# Patient Record
Sex: MALE | Race: OTHER | HISPANIC OR LATINO
[De-identification: names, ages, dates, MRNs, and addresses within clinical notes are randomized per-mention and may not be internally consistent; named-entity substitution may affect disease eponyms.]

---

## 2020-12-07 DIAGNOSIS — Z20.828 CONTACT WITH AND (SUSPECTED) EXPOSURE TO OTHER VIRAL COMMUNICABLE DISEASES: ICD-10-CM

## 2020-12-07 PROBLEM — Z00.00 ENCOUNTER FOR PREVENTIVE HEALTH EXAMINATION: Status: ACTIVE | Noted: 2020-12-07

## 2021-01-18 DIAGNOSIS — Z20.822 ENCOUNTER FOR PREPROCEDURAL LABORATORY EXAMINATION: ICD-10-CM

## 2021-01-18 DIAGNOSIS — Z01.812 ENCOUNTER FOR PREPROCEDURAL LABORATORY EXAMINATION: ICD-10-CM

## 2021-01-23 ENCOUNTER — APPOINTMENT (OUTPATIENT)
Dept: DISASTER EMERGENCY | Facility: CLINIC | Age: 56
End: 2021-01-23

## 2021-01-23 DIAGNOSIS — Z01.818 ENCOUNTER FOR OTHER PREPROCEDURAL EXAMINATION: ICD-10-CM

## 2021-01-26 ENCOUNTER — APPOINTMENT (OUTPATIENT)
Dept: INTERNAL MEDICINE | Facility: CLINIC | Age: 56
End: 2021-01-26
Payer: MEDICAID

## 2021-01-26 ENCOUNTER — NON-APPOINTMENT (OUTPATIENT)
Age: 56
End: 2021-01-26

## 2021-01-26 VITALS
DIASTOLIC BLOOD PRESSURE: 70 MMHG | HEART RATE: 74 BPM | BODY MASS INDEX: 23.24 KG/M2 | TEMPERATURE: 99.2 F | OXYGEN SATURATION: 97 % | WEIGHT: 166 LBS | SYSTOLIC BLOOD PRESSURE: 110 MMHG | HEIGHT: 71 IN | RESPIRATION RATE: 16 BRPM

## 2021-01-26 DIAGNOSIS — G12.20 MOTOR NEURON DISEASE, UNSPECIFIED: ICD-10-CM

## 2021-01-26 DIAGNOSIS — R13.10 DYSPHAGIA, UNSPECIFIED: ICD-10-CM

## 2021-01-26 LAB — SARS-COV-2 N GENE NPH QL NAA+PROBE: NOT DETECTED

## 2021-01-26 PROCEDURE — 99072 ADDL SUPL MATRL&STAF TM PHE: CPT

## 2021-01-26 PROCEDURE — 94727 GAS DIL/WSHOT DETER LNG VOL: CPT

## 2021-01-26 PROCEDURE — 99204 OFFICE O/P NEW MOD 45 MIN: CPT | Mod: 25

## 2021-01-26 PROCEDURE — 94729 DIFFUSING CAPACITY: CPT

## 2021-01-26 PROCEDURE — 94010 BREATHING CAPACITY TEST: CPT

## 2021-01-26 PROCEDURE — XXXXX: CPT

## 2021-01-26 NOTE — PHYSICAL EXAM
[No Acute Distress] : no acute distress [Normal Oropharynx] : normal oropharynx [Normal Appearance] : normal appearance [No Neck Mass] : no neck mass [Normal Rate/Rhythm] : normal rate/rhythm [Normal S1, S2] : normal s1, s2 [No Murmurs] : no murmurs [No Resp Distress] : no resp distress [Clear to Auscultation Bilaterally] : clear to auscultation bilaterally [No Abnormalities] : no abnormalities [Benign] : benign [Normal Gait] : normal gait [No Clubbing] : no clubbing [No Cyanosis] : no cyanosis [No Edema] : no edema [Normal Color/ Pigmentation] : normal color/ pigmentation [No Focal Deficits] : no focal deficits [Oriented x3] : oriented x3 [Normal Affect] : normal affect [TextBox_68] : few audible rhonchi bilat.

## 2021-01-26 NOTE — PROCEDURE
[FreeTextEntry1] : Full pulmonary function testing today is within normal limits.  Diffusing capacity is normal.  Oxygen saturation is 97% on room air.

## 2021-01-26 NOTE — ASSESSMENT
[FreeTextEntry1] : #1  Patient being followed by neurology, Dr. 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  He is also to see gastroenterology soon.  Instructions were for important reports to be forwarded here for our records.  He denies having symptoms suggestive of aspiration and is on a regular diet and liquids.  He has no previous history of pneumonia.  He will continue to follow with his neurologist as well as gastroenterology evaluation.  Instructed him to return in 4 months for a following pulmonary appointment.  Call or return at any time if there should be any symptoms or clinical change.\par \par Reportedly had a normal chest x-ray in July 2020.  For PA and lateral chest x-ray.

## 2021-01-26 NOTE — HISTORY OF PRESENT ILLNESS
[TextBox_4] : Patient is accompanied by his stepdaughter Prieto, who translates Sinhala/English.  \par \par This is the first pulmonary visit here for this 55-year-old male who was noted some difficulty or swallowing symptoms for about 1 year.  Currently being followed by Dr. James Contreras, neurologist, at 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  She tells me that the neurologist has to follow him further to see if this diagnosis pans out.  Currently he is on a regular diet and liquids and denies having any aspiration symptoms.  No history of pneumonia.  Not having coughing, wheezing, sputum production, hemoptysis, or dyspnea on exertion.  He is not having fever or chills.  He is a non-smoker.  He has an occasional alcohol drink.\par \par He has no history of heart disease.  He is not having recent chest pain, palpitations, or syncope.\par \par Denies having weakness.\par \par The patient [place] up in [place] moved to the  1 year ago.

## 2021-01-26 NOTE — REVIEW OF SYSTEMS
[Negative] : Endocrine [Fever] : no fever [Chills] : no chills [Cough] : no cough [Sputum] : no sputum [Dyspnea] : no dyspnea [TextBox_30] : see above [TextBox_69] : see above [TextBox_119] : see above

## 2021-05-11 ENCOUNTER — APPOINTMENT (OUTPATIENT)
Dept: INTERNAL MEDICINE | Facility: CLINIC | Age: 56
End: 2021-05-11

## 2021-07-06 ENCOUNTER — EMERGENCY (EMERGENCY)
Facility: HOSPITAL | Age: 56
LOS: 1 days | Discharge: HOME | End: 2021-07-06
Attending: EMERGENCY MEDICINE | Admitting: EMERGENCY MEDICINE
Payer: MEDICAID

## 2021-07-06 VITALS
RESPIRATION RATE: 18 BRPM | HEART RATE: 80 BPM | OXYGEN SATURATION: 99 % | DIASTOLIC BLOOD PRESSURE: 77 MMHG | TEMPERATURE: 98 F | SYSTOLIC BLOOD PRESSURE: 112 MMHG

## 2021-07-06 VITALS
OXYGEN SATURATION: 100 % | SYSTOLIC BLOOD PRESSURE: 120 MMHG | DIASTOLIC BLOOD PRESSURE: 68 MMHG | RESPIRATION RATE: 16 BRPM | TEMPERATURE: 97 F | HEIGHT: 71 IN | HEART RATE: 91 BPM | WEIGHT: 158.07 LBS

## 2021-07-06 LAB
HCOV PNL SPEC NAA+PROBE: DETECTED
RAPID RVP RESULT: DETECTED
S PYO DNA THROAT QL NAA+PROBE: SIGNIFICANT CHANGE UP
SARS-COV-2 RNA SPEC QL NAA+PROBE: SIGNIFICANT CHANGE UP

## 2021-07-06 PROCEDURE — 71046 X-RAY EXAM CHEST 2 VIEWS: CPT

## 2021-07-06 PROCEDURE — 87651 STREP A DNA AMP PROBE: CPT

## 2021-07-06 PROCEDURE — 71046 X-RAY EXAM CHEST 2 VIEWS: CPT | Mod: 26

## 2021-07-06 PROCEDURE — 0225U NFCT DS DNA&RNA 21 SARSCOV2: CPT

## 2021-07-06 PROCEDURE — 99283 EMERGENCY DEPT VISIT LOW MDM: CPT | Mod: 25

## 2021-07-06 PROCEDURE — 87798 DETECT AGENT NOS DNA AMP: CPT

## 2021-07-06 PROCEDURE — 99284 EMERGENCY DEPT VISIT MOD MDM: CPT

## 2021-07-06 NOTE — ED PROVIDER NOTE - CLINICAL SUMMARY MEDICAL DECISION MAKING FREE TEXT BOX
56 yo male with h/o motor neuron disease, cerebral infarct, GERD, myasthenia gravis, chronic dysphagia presents to the ED c/o SOB and difficulty clearing phlegm from his throat (which makes it hard to breath). + multiple sick contacts at home (viral symptoms). Patient received covid vaccine. Denies fever, chills, chest pain, cough, abd pain, N/V, UE/LE weakness or paresthesias. At baseline patient with difficulty speaking due to his autoimmune disease. Patient uses a machine at home for lung exercises. Patient trying to cough however difficulty due to his motor neuron disease. PE: as above. A/P: CXR, rvp panel, strep pcr. cxr neg for pneumonia, strep neg, rvp pending. recommend close pmd follow up.

## 2021-07-06 NOTE — ED PROVIDER NOTE - NSFOLLOWUPINSTRUCTIONS_ED_ALL_ED_FT
VIRAL SYNDROME - Discharge Care     Viral Syndrome    WHAT YOU NEED TO KNOW:    Viral syndrome is a term used for symptoms of an infection caused by a virus. Viruses are spread easily from person to person through the air and on shared items. An illness caused by a virus usually goes away in 10 to 14 days without treatment. Antibiotics are not given for a viral infection.     DISCHARGE INSTRUCTIONS:    Call 911 for the following:     You have a seizure.       You cannot be woken.       You have chest pain or trouble breathing.     Seek care immediately if:     You have a stiff neck, a bad headache, and sensitivity to light.       You feel weak, dizzy, or confused.       You stop urinating or urinate a lot less than normal.       You cough up blood or thick, yellow or green, mucus.       You have severe abdominal pain or your abdomen is larger than usual.     Contact your healthcare provider if:     Your symptoms do not get better with treatment, or get worse, after 3 days.       You have a rash or ear pain.       You have burning when you urinate.       You have questions or concerns about your condition or care.    Medicines: You may need any of the following:     Acetaminophen decreases pain and fever. It is available without a doctor's order. Ask how much medicine to take and how often to take it. Follow directions. Acetaminophen can cause liver damage if not taken correctly.       NSAIDs, such as ibuprofen, help decrease swelling, pain, and fever. NSAIDs can cause stomach bleeding or kidney problems in certain people. If you take blood thinner medicine, always ask your healthcare provider if NSAIDs are safe for you. Always read the medicine label and follow directions.      Cold medicine helps decrease swelling, control a cough, and relieve chest or nasal congestion.       Saline nasal spray helps decrease nasal congestion.       Take your medicine as directed. Contact your healthcare provider if you think your medicine is not helping or if you have side effects. Tell him of her if you are allergic to any medicine. Keep a list of the medicines, vitamins, and herbs you take. Include the amounts, and when and why you take them. Bring the list or the pill bottles to follow-up visits. Carry your medicine list with you in case of an emergency.    Manage your symptoms:     Drink liquids as directed to prevent dehydration. Ask how much liquid to drink each day and which liquids are best for you. Ask if you should drink an oral rehydration solution (ORS). An ORS has the right amounts of water, salts, and sugar you need to replace body fluids. This may help prevent dehydration caused by vomiting or diarrhea. Do not drink liquids with caffeine. Drinks with caffeine can make dehydration worse.       Get plenty of rest to help your body heal. Take naps throughout the day. Ask your healthcare provider when you can return to work and your normal activities.       Use a cool mist humidifier to help you breathe easier if you have nasal or chest congestion. Ask your healthcare provider how to use a cool mist humidifier.       Eat honey or use cough drops to help decrease throat discomfort. Ask your healthcare provider how much honey you should eat each day. Cough drops are available without a doctor's order. Follow directions for taking cough drops.       Do not smoke and stay away from others who smoke. Nicotine and other chemicals in cigarettes and cigars can cause lung damage. Smoking can also delay healing. Ask your healthcare provider for information if you currently smoke and need help to quit. E-cigarettes or smokeless tobacco still contain nicotine. Talk to your healthcare provider before you use these products.       Wash your hands frequently to prevent the spread of germs to others. Use soap and water. Use gel hand  when soap and water are not available. Wash your hands after you use the bathroom, cough, or sneeze. Wash your hands before you prepare or eat food.     Follow up with your healthcare provider as directed: Write down your questions so you remember to ask them during your visits.

## 2021-07-06 NOTE — ED PROVIDER NOTE - OBJECTIVE STATEMENT
56 yo male with h/o motor neuron disease, cerebral infarct, GERD, myasthenia gravis, chronic dysphagia presents to the ED c/o SOB and difficulty clearing phlegm from his throat (which makes it hard to breath). + multiple sick contacts at home (viral symptoms). Patient received covid vaccine. Denies fever, chills, chest pain, cough, abd pain, N/V, UE/LE weakness or paresthesias. At baseline patient with difficulty speaking due to his autoimmune disease. Patient uses a machine at home for lung exercises. Patient trying to cough however difficulty due to his motor neuron disease.

## 2021-07-06 NOTE — ED ADULT NURSE NOTE - PMH
DISPLAY PLAN FREE TEXT
Cerebral infarct    Gastroesophageal reflux disease, unspecified whether esophagitis present    Motor neuron disease    Myasthenia gravis

## 2021-07-06 NOTE — ED ADULT TRIAGE NOTE - CHIEF COMPLAINT QUOTE
shortness of breathe and feels as if mucus stuck in throat and has to keep drinking water to get down.  denies fever or chills.  h/o motor neuron disease for 1 year being followed at [place] per sister

## 2021-07-06 NOTE — ED PROVIDER NOTE - PATIENT PORTAL LINK FT
You can access the FollowMyHealth Patient Portal offered by [facility] by registering at the following website: [email or web address]. By joining [employer]’s FollowMyHealth portal, you will also be able to view your health information using other applications (apps) compatible with our system.

## 2021-07-06 NOTE — ED PROVIDER NOTE - PROGRESS NOTE DETAILS
Camilo Falk for attending Dr. Wells: 54 y/o M with a PMHx of myasthenia gravis, motor neuron disease, dysphagia, cerebral infraction, and GERD presents to the ED c/o SOB. Pt started having phlegm in his throat last night that makes it difficult to breathe. Pt denies cough, fevers. Pt endorses sick contact from a family member w/ a cold. Pt is COVID immunized. Pt has difficulty speaking at baseline 2/2 his autoimmune disease weakness. Pt uses a machine at home that helps exercise his lungs. Pt has been drinking a lot of water and eats whole foods. Pt follows w/ a neurologist. PCP: Dr. Alexis.    Physical exam: afebrile, O2 sat 100% ORA, NAD, ears/nose/oropharynx clear, no stridor, lungs CTA, heart S1S2, RRR, abd soft    MDM: SOB, r/o PNA, r/o pharyngitis. Plan CXR, strep and RVP.

## 2021-07-06 NOTE — ED ADULT NURSE NOTE - CHIEF COMPLAINT QUOTE
shortness of breathe and feels as if mucus stuck in throat and has to keep drinking water to get down.  denies fever or chills.  h/o motor neuron disease for 1 year being followed at [place] per sister

## 2021-07-06 NOTE — ED ADULT NURSE NOTE - OBJECTIVE STATEMENT
per sister in law at bedside, pt started with phlegm in his throat last night which make it hard to breath. family was sick with cold at home     Pt is COVID immunized. Pt has difficulty speaking at baseline.

## 2021-07-06 NOTE — ED PROVIDER NOTE - CARE PROVIDER_API CALL
Johnny Malik  INTERNAL MEDICINE  [address] [place] [address]  Phone: [phone]  Fax: [phone]  Follow Up Time: 1-3 Days

## 2021-07-06 NOTE — ED PROVIDER NOTE - PMH
Cerebral infarct    Gastroesophageal reflux disease, unspecified whether esophagitis present    Motor neuron disease    Myasthenia gravis

## 2021-07-07 NOTE — ED POST DISCHARGE NOTE - DETAILS
spoke to patient's family, aware of swab results, recommend close pmd follow and return to hospital if symptoms worsen.

## 2021-11-19 ENCOUNTER — EMERGENCY (EMERGENCY)
Facility: HOSPITAL | Age: 56
LOS: 1 days | Discharge: HOME | End: 2021-11-19
Attending: EMERGENCY MEDICINE | Admitting: EMERGENCY MEDICINE
Payer: MEDICAID

## 2021-11-19 VITALS
WEIGHT: 141.54 LBS | HEIGHT: 71 IN | TEMPERATURE: 99 F | OXYGEN SATURATION: 98 % | RESPIRATION RATE: 20 BRPM | HEART RATE: 118 BPM | SYSTOLIC BLOOD PRESSURE: 150 MMHG | DIASTOLIC BLOOD PRESSURE: 80 MMHG

## 2021-11-19 VITALS
HEART RATE: 99 BPM | OXYGEN SATURATION: 98 % | RESPIRATION RATE: 21 BRPM | TEMPERATURE: 98 F | DIASTOLIC BLOOD PRESSURE: 70 MMHG | SYSTOLIC BLOOD PRESSURE: 117 MMHG

## 2021-11-19 DIAGNOSIS — Z93.1 GASTROSTOMY STATUS: Chronic | ICD-10-CM

## 2021-11-19 LAB
ALBUMIN SERPL ELPH-MCNC: 3.6 G/DL — SIGNIFICANT CHANGE UP (ref 3.3–5)
ALP SERPL-CCNC: 81 U/L — SIGNIFICANT CHANGE UP (ref 30–120)
ALT FLD-CCNC: 40 U/L DA — SIGNIFICANT CHANGE UP (ref 10–60)
ANION GAP SERPL CALC-SCNC: 6 MMOL/L — SIGNIFICANT CHANGE UP (ref 5–17)
AST SERPL-CCNC: 27 U/L — SIGNIFICANT CHANGE UP (ref 10–40)
BASOPHILS # BLD AUTO: 0.01 K/UL — SIGNIFICANT CHANGE UP (ref 0–0.2)
BASOPHILS NFR BLD AUTO: 0.1 % — SIGNIFICANT CHANGE UP (ref 0–2)
BILIRUB SERPL-MCNC: 0.2 MG/DL — SIGNIFICANT CHANGE UP (ref 0.2–1.2)
BUN SERPL-MCNC: 19 MG/DL — SIGNIFICANT CHANGE UP (ref 7–23)
CALCIUM SERPL-MCNC: 9 MG/DL — SIGNIFICANT CHANGE UP (ref 8.4–10.5)
CHLORIDE SERPL-SCNC: 96 MMOL/L — SIGNIFICANT CHANGE UP (ref 96–108)
CO2 SERPL-SCNC: 33 MMOL/L — HIGH (ref 22–31)
CREAT SERPL-MCNC: 1.03 MG/DL — SIGNIFICANT CHANGE UP (ref 0.5–1.3)
D DIMER BLD IA.RAPID-MCNC: <150 NG/ML DDU — SIGNIFICANT CHANGE UP
EOSINOPHIL # BLD AUTO: 0.05 K/UL — SIGNIFICANT CHANGE UP (ref 0–0.5)
EOSINOPHIL NFR BLD AUTO: 0.7 % — SIGNIFICANT CHANGE UP (ref 0–6)
GLUCOSE SERPL-MCNC: 128 MG/DL — HIGH (ref 70–99)
HCT VFR BLD CALC: 45 % — SIGNIFICANT CHANGE UP (ref 39–50)
HGB BLD-MCNC: 15 G/DL — SIGNIFICANT CHANGE UP (ref 13–17)
IMM GRANULOCYTES NFR BLD AUTO: 0.3 % — SIGNIFICANT CHANGE UP (ref 0–1.5)
LYMPHOCYTES # BLD AUTO: 1.04 K/UL — SIGNIFICANT CHANGE UP (ref 1–3.3)
LYMPHOCYTES # BLD AUTO: 14.9 % — SIGNIFICANT CHANGE UP (ref 13–44)
MCHC RBC-ENTMCNC: 29.7 PG — SIGNIFICANT CHANGE UP (ref 27–34)
MCHC RBC-ENTMCNC: 33.3 GM/DL — SIGNIFICANT CHANGE UP (ref 32–36)
MCV RBC AUTO: 89.1 FL — SIGNIFICANT CHANGE UP (ref 80–100)
MONOCYTES # BLD AUTO: 0.39 K/UL — SIGNIFICANT CHANGE UP (ref 0–0.9)
MONOCYTES NFR BLD AUTO: 5.6 % — SIGNIFICANT CHANGE UP (ref 2–14)
NEUTROPHILS # BLD AUTO: 5.46 K/UL — SIGNIFICANT CHANGE UP (ref 1.8–7.4)
NEUTROPHILS NFR BLD AUTO: 78.4 % — HIGH (ref 43–77)
NRBC # BLD: 0 /100 WBCS — SIGNIFICANT CHANGE UP (ref 0–0)
NT-PROBNP SERPL-SCNC: 17 PG/ML — SIGNIFICANT CHANGE UP (ref 0–125)
PLATELET # BLD AUTO: 251 K/UL — SIGNIFICANT CHANGE UP (ref 150–400)
POTASSIUM SERPL-MCNC: 4.4 MMOL/L — SIGNIFICANT CHANGE UP (ref 3.5–5.3)
POTASSIUM SERPL-SCNC: 4.4 MMOL/L — SIGNIFICANT CHANGE UP (ref 3.5–5.3)
PROT SERPL-MCNC: 7.5 G/DL — SIGNIFICANT CHANGE UP (ref 6–8.3)
RAPID RVP RESULT: SIGNIFICANT CHANGE UP
RBC # BLD: 5.05 M/UL — SIGNIFICANT CHANGE UP (ref 4.2–5.8)
RBC # FLD: 12.8 % — SIGNIFICANT CHANGE UP (ref 10.3–14.5)
SARS-COV-2 RNA SPEC QL NAA+PROBE: SIGNIFICANT CHANGE UP
SODIUM SERPL-SCNC: 135 MMOL/L — SIGNIFICANT CHANGE UP (ref 135–145)
TROPONIN I, HIGH SENSITIVITY RESULT: 5.3 NG/L — SIGNIFICANT CHANGE UP
WBC # BLD: 6.97 K/UL — SIGNIFICANT CHANGE UP (ref 3.8–10.5)
WBC # FLD AUTO: 6.97 K/UL — SIGNIFICANT CHANGE UP (ref 3.8–10.5)

## 2021-11-19 PROCEDURE — 83880 ASSAY OF NATRIURETIC PEPTIDE: CPT

## 2021-11-19 PROCEDURE — 36415 COLL VENOUS BLD VENIPUNCTURE: CPT

## 2021-11-19 PROCEDURE — 71046 X-RAY EXAM CHEST 2 VIEWS: CPT

## 2021-11-19 PROCEDURE — 0225U NFCT DS DNA&RNA 21 SARSCOV2: CPT

## 2021-11-19 PROCEDURE — 85025 COMPLETE CBC W/AUTO DIFF WBC: CPT

## 2021-11-19 PROCEDURE — 93005 ELECTROCARDIOGRAM TRACING: CPT

## 2021-11-19 PROCEDURE — 84484 ASSAY OF TROPONIN QUANT: CPT

## 2021-11-19 PROCEDURE — 99285 EMERGENCY DEPT VISIT HI MDM: CPT

## 2021-11-19 PROCEDURE — 71046 X-RAY EXAM CHEST 2 VIEWS: CPT | Mod: 26

## 2021-11-19 PROCEDURE — 93010 ELECTROCARDIOGRAM REPORT: CPT

## 2021-11-19 PROCEDURE — 93970 EXTREMITY STUDY: CPT | Mod: 26

## 2021-11-19 PROCEDURE — 93970 EXTREMITY STUDY: CPT

## 2021-11-19 PROCEDURE — 85379 FIBRIN DEGRADATION QUANT: CPT

## 2021-11-19 PROCEDURE — 99284 EMERGENCY DEPT VISIT MOD MDM: CPT | Mod: 25

## 2021-11-19 PROCEDURE — 80053 COMPREHEN METABOLIC PANEL: CPT

## 2021-11-19 RX ORDER — SODIUM CHLORIDE 9 MG/ML
1000 INJECTION INTRAMUSCULAR; INTRAVENOUS; SUBCUTANEOUS ONCE
Refills: 0 | Status: COMPLETED | OUTPATIENT
Start: 2021-11-19 | End: 2021-11-19

## 2021-11-19 RX ORDER — FAMOTIDINE 10 MG/ML
1 INJECTION INTRAVENOUS
Qty: 0 | Refills: 0 | DISCHARGE

## 2021-11-19 RX ADMIN — SODIUM CHLORIDE 1000 MILLILITER(S): 9 INJECTION INTRAMUSCULAR; INTRAVENOUS; SUBCUTANEOUS at 18:10

## 2021-11-19 RX ADMIN — SODIUM CHLORIDE 1000 MILLILITER(S): 9 INJECTION INTRAMUSCULAR; INTRAVENOUS; SUBCUTANEOUS at 19:17

## 2021-11-19 NOTE — ED PROVIDER NOTE - CONSTITUTIONAL, MLM
normal... Well appearing, awake, alert, oriented to person, place, time/situation and in no apparent distress. Well appearing, awake, alert,  and in no apparent distress. Nonverbal

## 2021-11-19 NOTE — ED PROVIDER NOTE - PROGRESS NOTE DETAILS
pt was unable to tolerate CTA due to chronic inability to lie down. Reevaluated patient at bedside.  Patient feeling much improved, no sob.  Discussed the results of all diagnostic testing in ED and copies of all reports given.  Patient and family refusing admission. An opportunity to ask questions was given.  Discussed the importance of prompt, close medical follow-up.  Patient will return with any changes, concerns or persistent / worsening symptoms.  Understanding of all instructions verbalized.

## 2021-11-19 NOTE — ED PROVIDER NOTE - NSICDXPASTMEDICALHX_GEN_ALL_CORE_FT
PAST MEDICAL HISTORY:  Cerebral infarct     Gastroesophageal reflux disease, unspecified whether esophagitis present     Motor neuron disease     Myasthenia gravis

## 2021-11-19 NOTE — ED ADULT NURSE NOTE - NSICDXPASTMEDICALHX_GEN_ALL_CORE_FT
PAST MEDICAL HISTORY:  Cerebral infarct     Dysphagia     Gastroesophageal reflux disease, unspecified whether esophagitis present     Motor neuron disease ALS    Myasthenia gravis

## 2021-11-19 NOTE — ED ADULT NURSE NOTE - OBJECTIVE STATEMENT
Patient brought to ED by sister. Patient with dysphagia secondary to Neuron Disease/ALS which was diagnosed in 2020 when he developed difficulty swallowing, Sister speaks for patient as he is unable due to his disease. Patient uses a form of sign language with his sister and shakes head in response to yes/no questions and is fully alert and oriented. Patient c/o increasing shortness of breath in past few days. Patient had a virtual visit with his neurologist and Pulmonary Function testing was ordered and done on 11/17 and per sister shows diminished lung function to 40%. Patient denies any pain, no fever.

## 2021-11-19 NOTE — ED PROVIDER NOTE - NEUROLOGICAL, MLM
Alert and oriented, no focal deficits, no motor or sensory deficits. Alert  unable to speak otherwise no focal deficits

## 2021-11-19 NOTE — ED PROVIDER NOTE - OBJECTIVE STATEMENT
57 y/o M w/ PMHx of ALS motor neuron disease, cerebral infarct, GERD, myasthenia gravis, chronic dysphagia presents to the ED c/o    . PCP: Dr. Soto. Neurologist: gwen Morel in [facility]. 57 y/o M w/ PMHx of motor neuron disease, cerebral infarct, GERD, myasthenia gravis, chronic dysphagia, pt chronically nonverbal 2/2 motor neuron disease but able to answer question yes/no w/ nodding. Pt referred to ED by PMD for SOB today. No fever chills cough chest pain, abd pain vomiting, calf pain or edema. Pt family relates pt recently has pulmonary function test at [facility] which revealed 40% funtion of lungs. PCP: Dr. Soto. Neurologist: at [facility] in [facility]. 55 y/o M w/ PMHx of motor neuron disease, cerebral infarct, GERD, myasthenia gravis, chronic dysphagia, pt chronically nonverbal 2/2 motor neuron disease but able to answer question yes/no w/ nodding. Pt referred to ED by PMD for SOB since this am. No fever chills cough chest pain, abd pain vomiting, calf pain or edema. Pt family relates pt recently has pulmonary function test at [facility] which revealed 40% funtion of lungs. PCP: Dr. Soto. Neurologist: at [facility] in [facility].

## 2021-11-19 NOTE — ED PROVIDER NOTE - GASTROINTESTINAL, MLM
Abdomen soft, non-tender, no guarding. no cvat Abdomen soft, non-tender, + peg in place. no guarding. no cvat

## 2021-11-19 NOTE — ED ADULT NURSE NOTE - CAS TRG GEN SKIN CONDITION
You can access the [facility], offered by [facility], by registering with the following website: [email or web address]
[email or web address]

## 2021-11-19 NOTE — ED PROVIDER NOTE - CARE PROVIDER_API CALL
Johnny Malik  INTERNAL MEDICINE  [address] [place] [address]  Phone: [phone]  Fax: [phone]  Follow Up Time: 1-3 Days

## 2021-11-19 NOTE — ED PROVIDER NOTE - PATIENT PORTAL LINK FT
You can access the FollowMyHealth Patient Portal offered by [facility] by registering at the following website: [email or web address]. By joining [employer]’s FollowMyHealth portal, you will also be able to view your health information using other applications (apps) compatible with our system.

## 2021-11-19 NOTE — ED PROVIDER NOTE - LAB INTERPRETATION
Respiratory Viral Panel with COVID-19 by SOTO (11.19.21 @ 18:15)   Rapid RVP Result: [facility]   SARS-CoV-2: [facility]: This Respiratory Panel uses polymerase chain reaction (PCR) to detect for   adenovirus; coronavirus (HKU1, NL63, 229E, OC43); human metapneumovirus   (hMPV); human enterovirus/rhinovirus (Entero/RV); influenza A; influenza   A/H1; influenza A/H3; influenza A/H1-2009; influenza B; parainfluenza   viruses 1, 2, 3, 4; respiratory syncytial virus; Mycoplasma pneumoniae;   Chlamydophila pneumoniae; and SARS-CoV-2.

## 2021-11-20 PROBLEM — K21.9 GASTRO-ESOPHAGEAL REFLUX DISEASE WITHOUT ESOPHAGITIS: Chronic | Status: ACTIVE | Noted: 2021-07-06

## 2021-11-20 PROBLEM — I63.9 CEREBRAL INFARCTION, UNSPECIFIED: Chronic | Status: ACTIVE | Noted: 2021-07-06

## 2021-11-20 PROBLEM — G70.00 MYASTHENIA GRAVIS WITHOUT (ACUTE) EXACERBATION: Chronic | Status: ACTIVE | Noted: 2021-07-06

## 2022-06-13 ENCOUNTER — EMERGENCY (EMERGENCY)
Facility: HOSPITAL | Age: 57
LOS: 0 days | Discharge: HOME | End: 2022-06-14
Attending: STUDENT IN AN ORGANIZED HEALTH CARE EDUCATION/TRAINING PROGRAM
Payer: MEDICAID

## 2022-06-13 VITALS — HEIGHT: 71 IN | WEIGHT: 143.08 LBS

## 2022-06-13 DIAGNOSIS — G12.21 AMYOTROPHIC LATERAL SCLEROSIS: ICD-10-CM

## 2022-06-13 DIAGNOSIS — K94.23 GASTROSTOMY MALFUNCTION: ICD-10-CM

## 2022-06-13 DIAGNOSIS — Z20.822 CONTACT WITH AND (SUSPECTED) EXPOSURE TO COVID-19: ICD-10-CM

## 2022-06-13 DIAGNOSIS — Z93.1 GASTROSTOMY STATUS: Chronic | ICD-10-CM

## 2022-06-13 DIAGNOSIS — K94.22 GASTROSTOMY INFECTION: ICD-10-CM

## 2022-06-13 LAB
ADD ON TEST-SPECIMEN IN LAB: SIGNIFICANT CHANGE UP
ALBUMIN SERPL ELPH-MCNC: 3.8 G/DL — SIGNIFICANT CHANGE UP (ref 3.3–5)
ALP SERPL-CCNC: 90 U/L — SIGNIFICANT CHANGE UP (ref 40–120)
ALT FLD-CCNC: 48 U/L — SIGNIFICANT CHANGE UP (ref 12–78)
ANION GAP SERPL CALC-SCNC: 3 MMOL/L — LOW (ref 5–17)
AST SERPL-CCNC: 31 U/L — SIGNIFICANT CHANGE UP (ref 15–37)
BASOPHILS # BLD AUTO: 0.03 K/UL — SIGNIFICANT CHANGE UP (ref 0–0.2)
BASOPHILS NFR BLD AUTO: 0.6 % — SIGNIFICANT CHANGE UP (ref 0–2)
BILIRUB SERPL-MCNC: 0.4 MG/DL — SIGNIFICANT CHANGE UP (ref 0.2–1.2)
BUN SERPL-MCNC: 19 MG/DL — SIGNIFICANT CHANGE UP (ref 7–23)
CALCIUM SERPL-MCNC: 9.6 MG/DL — SIGNIFICANT CHANGE UP (ref 8.5–10.1)
CHLORIDE SERPL-SCNC: 102 MMOL/L — SIGNIFICANT CHANGE UP (ref 96–108)
CO2 SERPL-SCNC: 33 MMOL/L — HIGH (ref 22–31)
CREAT SERPL-MCNC: 0.98 MG/DL — SIGNIFICANT CHANGE UP (ref 0.5–1.3)
EGFR: 90 ML/MIN/1.73M2 — SIGNIFICANT CHANGE UP
EOSINOPHIL # BLD AUTO: 0.05 K/UL — SIGNIFICANT CHANGE UP (ref 0–0.5)
EOSINOPHIL NFR BLD AUTO: 1 % — SIGNIFICANT CHANGE UP (ref 0–6)
GLUCOSE SERPL-MCNC: 103 MG/DL — HIGH (ref 70–99)
HCT VFR BLD CALC: 44.1 % — SIGNIFICANT CHANGE UP (ref 39–50)
HGB BLD-MCNC: 14.5 G/DL — SIGNIFICANT CHANGE UP (ref 13–17)
IMM GRANULOCYTES NFR BLD AUTO: 0.2 % — SIGNIFICANT CHANGE UP (ref 0–1.5)
LYMPHOCYTES # BLD AUTO: 1.14 K/UL — SIGNIFICANT CHANGE UP (ref 1–3.3)
LYMPHOCYTES # BLD AUTO: 22.5 % — SIGNIFICANT CHANGE UP (ref 13–44)
MCHC RBC-ENTMCNC: 29.9 PG — SIGNIFICANT CHANGE UP (ref 27–34)
MCHC RBC-ENTMCNC: 32.9 GM/DL — SIGNIFICANT CHANGE UP (ref 32–36)
MCV RBC AUTO: 90.9 FL — SIGNIFICANT CHANGE UP (ref 80–100)
MONOCYTES # BLD AUTO: 0.39 K/UL — SIGNIFICANT CHANGE UP (ref 0–0.9)
MONOCYTES NFR BLD AUTO: 7.7 % — SIGNIFICANT CHANGE UP (ref 2–14)
NEUTROPHILS # BLD AUTO: 3.44 K/UL — SIGNIFICANT CHANGE UP (ref 1.8–7.4)
NEUTROPHILS NFR BLD AUTO: 68 % — SIGNIFICANT CHANGE UP (ref 43–77)
PLATELET # BLD AUTO: 259 K/UL — SIGNIFICANT CHANGE UP (ref 150–400)
POTASSIUM SERPL-MCNC: 4.3 MMOL/L — SIGNIFICANT CHANGE UP (ref 3.5–5.3)
POTASSIUM SERPL-SCNC: 4.3 MMOL/L — SIGNIFICANT CHANGE UP (ref 3.5–5.3)
PROT SERPL-MCNC: 8 GM/DL — SIGNIFICANT CHANGE UP (ref 6–8.3)
RBC # BLD: 4.85 M/UL — SIGNIFICANT CHANGE UP (ref 4.2–5.8)
RBC # FLD: 12.7 % — SIGNIFICANT CHANGE UP (ref 10.3–14.5)
SODIUM SERPL-SCNC: 138 MMOL/L — SIGNIFICANT CHANGE UP (ref 135–145)
WBC # BLD: 5.06 K/UL — SIGNIFICANT CHANGE UP (ref 3.8–10.5)
WBC # FLD AUTO: 5.06 K/UL — SIGNIFICANT CHANGE UP (ref 3.8–10.5)

## 2022-06-13 PROCEDURE — 99285 EMERGENCY DEPT VISIT HI MDM: CPT | Mod: 25

## 2022-06-13 PROCEDURE — 93005 ELECTROCARDIOGRAM TRACING: CPT

## 2022-06-13 PROCEDURE — U0005: CPT

## 2022-06-13 PROCEDURE — 85025 COMPLETE CBC W/AUTO DIFF WBC: CPT

## 2022-06-13 PROCEDURE — 36415 COLL VENOUS BLD VENIPUNCTURE: CPT

## 2022-06-13 PROCEDURE — 96376 TX/PRO/DX INJ SAME DRUG ADON: CPT

## 2022-06-13 PROCEDURE — 93010 ELECTROCARDIOGRAM REPORT: CPT

## 2022-06-13 PROCEDURE — 99285 EMERGENCY DEPT VISIT HI MDM: CPT

## 2022-06-13 PROCEDURE — 80053 COMPREHEN METABOLIC PANEL: CPT

## 2022-06-13 PROCEDURE — U0003: CPT

## 2022-06-13 PROCEDURE — 96374 THER/PROPH/DIAG INJ IV PUSH: CPT

## 2022-06-13 PROCEDURE — 74177 CT ABD & PELVIS W/CONTRAST: CPT | Mod: MA

## 2022-06-13 RX ADMIN — Medication 1 MILLIGRAM(S): at 23:04

## 2022-06-13 NOTE — ED PROVIDER NOTE - PATIENT PORTAL LINK FT
You can access the FollowMyHealth Patient Portal offered by [facility] by registering at the following website: [email or web address]. By joining [employer]’s FollowMyHealth portal, you will also be able to view your health information using other applications (apps) compatible with our system.

## 2022-06-13 NOTE — ED ADULT NURSE REASSESSMENT NOTE - NS ED NURSE REASSESS COMMENT FT1
pt continues to refuse CT stating meds aren't helping. MD aware, when pt in room he's resting on stretcher, very calm and collected, no signs of distress or anxiety. pt wears his own bipap machine

## 2022-06-13 NOTE — ED ADULT TRIAGE NOTE - CHIEF COMPLAINT QUOTE
pt states that feeding tube insertion site infected and started having drainage yesterday. pmhx ALS pt presents with bipap machine due to 40% lung capacity

## 2022-06-13 NOTE — ED PROVIDER NOTE - NSFOLLOWUPINSTRUCTIONS_ED_ALL_ED_FT
please follow up with your doctor in 2-3 days.   clean area with soap and water.   keep clean and dry.  return to ED for any concerns

## 2022-06-13 NOTE — ED ADULT NURSE NOTE - OBJECTIVE STATEMENT
pt arrives w/nephew reporting a feeding tube issue. states it started oozing puss around insertion site., pt has ALS and multiple other chronic conditions but denies pain or other complaints. EKG done @ bedside concerning for STEMI, MD @ bedside w/multiple repeats. pt denies CP, dizziness, N/V, states "this always happens" and has older EKGs on phone. pt very well appearing, denies fevers @ home d/t feeding tube possible infection

## 2022-06-13 NOTE — ED PROVIDER NOTE - OBJECTIVE STATEMENT
55 y/o M w/ PMHx of ALS s/p feeding tube placement on 9/2021 presents to ED c/o feeding tube infection. Nephew at bedside states pt noted pus around feeding tube and spoke to doctor who advised pt to come to ED for further evaluation.  Denies chest pain, SOB, sweating, fever, chills. Pt is nonverbal at baseline.

## 2022-06-13 NOTE — ED PROVIDER NOTE - NS ED ROS FT
Constitutional: No reported recent fever.  Neurological: No reported acute headache.  Eyes: No reported new vision changes.   Ears, Nose, Mouth, Throat: No reported acute sore throat.  Cardiovascular: No reported current chest pain.  Respiratory: No reported new shortness of breath.  Gastrointestinal: No reported vomiting. +feeding tube infection   Genitourinary: No reported new urinary problems.  Musculoskeletal: No reported acute extremity pain.  Integumentary (skin and/or breast): No reported new rash.

## 2022-06-13 NOTE — ED ADULT NURSE REASSESSMENT NOTE - NS ED NURSE REASSESS COMMENT FT1
pt was not able to tolerate CT, brought back for meds, pt appears calm on stretcher but wife is insisting pt needs to go to sleep. attempted education that its just a CT not an MRI, very short exam, and pt given Ativan

## 2022-06-13 NOTE — ED PROVIDER NOTE - NS_EDPROVIDERDISPOUSERTYPE_ED_A_ED
Sent to dr brush   Scribe Attestation (For Scribes USE Only)... Attending Attestation (For Attendings USE Only).../Scribe Attestation (For Scribes USE Only)...

## 2022-06-13 NOTE — ED PROVIDER NOTE - PHYSICAL EXAMINATION
Vital signs as available reviewed.  General:  No acute distress. +cpap mask in place   Head:  Normocephalic, atraumatic.  Eyes:  Conjunctiva pink, no icterus.  Cardiovascular:  mild tachycardia, no obvious murmur.  Respiratory:  Clear to auscultation, good air entry bilaterally.  Abdomen:  Feeding tube in place epigastrica area mild surrounding erythema posterior aspect w/ small amount of purulent discharge no clear area of induration or fluctuance abd otherwise soft nontender.   Musculoskeletal:  No obvious deformity.  Neurologic: Alert and oriented, moving all extremities.  Skin:  Warm and dry.

## 2022-06-14 VITALS
HEART RATE: 89 BPM | SYSTOLIC BLOOD PRESSURE: 121 MMHG | TEMPERATURE: 99 F | DIASTOLIC BLOOD PRESSURE: 84 MMHG | RESPIRATION RATE: 18 BRPM | OXYGEN SATURATION: 97 %

## 2022-06-14 PROCEDURE — 74177 CT ABD & PELVIS W/CONTRAST: CPT | Mod: 26,MA

## 2022-06-14 RX ADMIN — Medication 2 MILLIGRAM(S): at 00:01

## 2022-07-28 ENCOUNTER — EMERGENCY (EMERGENCY)
Facility: HOSPITAL | Age: 57
LOS: 1 days | Discharge: HOME | End: 2022-07-28
Attending: EMERGENCY MEDICINE | Admitting: EMERGENCY MEDICINE
Payer: MEDICAID

## 2022-07-28 VITALS
HEIGHT: 71 IN | OXYGEN SATURATION: 96 % | SYSTOLIC BLOOD PRESSURE: 142 MMHG | RESPIRATION RATE: 20 BRPM | TEMPERATURE: 98 F | HEART RATE: 125 BPM | WEIGHT: 143.96 LBS | DIASTOLIC BLOOD PRESSURE: 89 MMHG

## 2022-07-28 VITALS
DIASTOLIC BLOOD PRESSURE: 87 MMHG | OXYGEN SATURATION: 98 % | TEMPERATURE: 98 F | RESPIRATION RATE: 20 BRPM | HEART RATE: 109 BPM | SYSTOLIC BLOOD PRESSURE: 138 MMHG

## 2022-07-28 DIAGNOSIS — Z93.1 GASTROSTOMY STATUS: Chronic | ICD-10-CM

## 2022-07-28 LAB
ALBUMIN SERPL ELPH-MCNC: 3.9 G/DL — SIGNIFICANT CHANGE UP (ref 3.3–5)
ALP SERPL-CCNC: 108 U/L — SIGNIFICANT CHANGE UP (ref 30–120)
ALT FLD-CCNC: 139 U/L DA — HIGH (ref 10–60)
ANION GAP SERPL CALC-SCNC: 1 MMOL/L — LOW (ref 5–17)
AST SERPL-CCNC: 55 U/L — HIGH (ref 10–40)
BASOPHILS # BLD AUTO: 0.02 K/UL — SIGNIFICANT CHANGE UP (ref 0–0.2)
BASOPHILS NFR BLD AUTO: 0.2 % — SIGNIFICANT CHANGE UP (ref 0–2)
BILIRUB SERPL-MCNC: 0.3 MG/DL — SIGNIFICANT CHANGE UP (ref 0.2–1.2)
BUN SERPL-MCNC: 24 MG/DL — HIGH (ref 7–23)
CALCIUM SERPL-MCNC: 9.2 MG/DL — SIGNIFICANT CHANGE UP (ref 8.4–10.5)
CHLORIDE SERPL-SCNC: 100 MMOL/L — SIGNIFICANT CHANGE UP (ref 96–108)
CO2 SERPL-SCNC: 38 MMOL/L — HIGH (ref 22–31)
CREAT SERPL-MCNC: 0.75 MG/DL — SIGNIFICANT CHANGE UP (ref 0.5–1.3)
D DIMER BLD IA.RAPID-MCNC: <150 NG/ML DDU — SIGNIFICANT CHANGE UP
EGFR: 105 ML/MIN/1.73M2 — SIGNIFICANT CHANGE UP
EOSINOPHIL # BLD AUTO: 0.03 K/UL — SIGNIFICANT CHANGE UP (ref 0–0.5)
EOSINOPHIL NFR BLD AUTO: 0.4 % — SIGNIFICANT CHANGE UP (ref 0–6)
GLUCOSE SERPL-MCNC: 117 MG/DL — HIGH (ref 70–99)
HCT VFR BLD CALC: 46 % — SIGNIFICANT CHANGE UP (ref 39–50)
HGB BLD-MCNC: 15.2 G/DL — SIGNIFICANT CHANGE UP (ref 13–17)
IMM GRANULOCYTES NFR BLD AUTO: 0.5 % — SIGNIFICANT CHANGE UP (ref 0–1.5)
LYMPHOCYTES # BLD AUTO: 0.67 K/UL — LOW (ref 1–3.3)
LYMPHOCYTES # BLD AUTO: 8 % — LOW (ref 13–44)
MCHC RBC-ENTMCNC: 30.3 PG — SIGNIFICANT CHANGE UP (ref 27–34)
MCHC RBC-ENTMCNC: 33 GM/DL — SIGNIFICANT CHANGE UP (ref 32–36)
MCV RBC AUTO: 91.6 FL — SIGNIFICANT CHANGE UP (ref 80–100)
MONOCYTES # BLD AUTO: 0.45 K/UL — SIGNIFICANT CHANGE UP (ref 0–0.9)
MONOCYTES NFR BLD AUTO: 5.4 % — SIGNIFICANT CHANGE UP (ref 2–14)
NEUTROPHILS # BLD AUTO: 7.16 K/UL — SIGNIFICANT CHANGE UP (ref 1.8–7.4)
NEUTROPHILS NFR BLD AUTO: 85.5 % — HIGH (ref 43–77)
NRBC # BLD: 0 /100 WBCS — SIGNIFICANT CHANGE UP (ref 0–0)
PLATELET # BLD AUTO: 229 K/UL — SIGNIFICANT CHANGE UP (ref 150–400)
POTASSIUM SERPL-MCNC: 4.5 MMOL/L — SIGNIFICANT CHANGE UP (ref 3.5–5.3)
POTASSIUM SERPL-SCNC: 4.5 MMOL/L — SIGNIFICANT CHANGE UP (ref 3.5–5.3)
PROT SERPL-MCNC: 8 G/DL — SIGNIFICANT CHANGE UP (ref 6–8.3)
RAPID RVP RESULT: SIGNIFICANT CHANGE UP
RBC # BLD: 5.02 M/UL — SIGNIFICANT CHANGE UP (ref 4.2–5.8)
RBC # FLD: 13.1 % — SIGNIFICANT CHANGE UP (ref 10.3–14.5)
SARS-COV-2 RNA SPEC QL NAA+PROBE: SIGNIFICANT CHANGE UP
SODIUM SERPL-SCNC: 139 MMOL/L — SIGNIFICANT CHANGE UP (ref 135–145)
TROPONIN I, HIGH SENSITIVITY RESULT: 4.3 NG/L — SIGNIFICANT CHANGE UP
WBC # BLD: 8.37 K/UL — SIGNIFICANT CHANGE UP (ref 3.8–10.5)
WBC # FLD AUTO: 8.37 K/UL — SIGNIFICANT CHANGE UP (ref 3.8–10.5)

## 2022-07-28 PROCEDURE — 93010 ELECTROCARDIOGRAM REPORT: CPT

## 2022-07-28 PROCEDURE — 96361 HYDRATE IV INFUSION ADD-ON: CPT

## 2022-07-28 PROCEDURE — 99285 EMERGENCY DEPT VISIT HI MDM: CPT | Mod: 25

## 2022-07-28 PROCEDURE — 80053 COMPREHEN METABOLIC PANEL: CPT

## 2022-07-28 PROCEDURE — 85025 COMPLETE CBC W/AUTO DIFF WBC: CPT

## 2022-07-28 PROCEDURE — 96374 THER/PROPH/DIAG INJ IV PUSH: CPT

## 2022-07-28 PROCEDURE — 71045 X-RAY EXAM CHEST 1 VIEW: CPT

## 2022-07-28 PROCEDURE — 0225U NFCT DS DNA&RNA 21 SARSCOV2: CPT

## 2022-07-28 PROCEDURE — 93005 ELECTROCARDIOGRAM TRACING: CPT

## 2022-07-28 PROCEDURE — 36415 COLL VENOUS BLD VENIPUNCTURE: CPT

## 2022-07-28 PROCEDURE — 99285 EMERGENCY DEPT VISIT HI MDM: CPT

## 2022-07-28 PROCEDURE — 85379 FIBRIN DEGRADATION QUANT: CPT

## 2022-07-28 PROCEDURE — 84484 ASSAY OF TROPONIN QUANT: CPT

## 2022-07-28 PROCEDURE — 71045 X-RAY EXAM CHEST 1 VIEW: CPT | Mod: 26

## 2022-07-28 RX ORDER — RILUZOLE 50 MG/1
1 TABLET ORAL
Qty: 0 | Refills: 0 | DISCHARGE

## 2022-07-28 RX ORDER — COLESTIPOL HCL 5 G
1 GRANULES (GRAM) ORAL
Qty: 0 | Refills: 0 | DISCHARGE

## 2022-07-28 RX ORDER — FEXOFENADINE HCL 30 MG
1 TABLET ORAL
Qty: 0 | Refills: 0 | DISCHARGE

## 2022-07-28 RX ORDER — ALPRAZOLAM 0.25 MG
1 TABLET ORAL
Qty: 10 | Refills: 0
Start: 2022-07-28 | End: 2022-07-30

## 2022-07-28 RX ORDER — AMITRIPTYLINE HCL 25 MG
1 TABLET ORAL
Qty: 0 | Refills: 0 | DISCHARGE

## 2022-07-28 RX ORDER — MILK THISTLE 150 MG
1 CAPSULE ORAL
Qty: 0 | Refills: 0 | DISCHARGE

## 2022-07-28 RX ORDER — GABAPENTIN 400 MG/1
1 CAPSULE ORAL
Qty: 0 | Refills: 0 | DISCHARGE

## 2022-07-28 RX ORDER — SODIUM CHLORIDE 9 MG/ML
1000 INJECTION INTRAMUSCULAR; INTRAVENOUS; SUBCUTANEOUS ONCE
Refills: 0 | Status: COMPLETED | OUTPATIENT
Start: 2022-07-28 | End: 2022-07-28

## 2022-07-28 RX ORDER — DIAZEPAM 5 MG
5 TABLET ORAL ONCE
Refills: 0 | Status: DISCONTINUED | OUTPATIENT
Start: 2022-07-28 | End: 2022-07-28

## 2022-07-28 RX ORDER — ZINC SULFATE TAB 220 MG (50 MG ZINC EQUIVALENT) 220 (50 ZN) MG
1 TAB ORAL
Qty: 0 | Refills: 0 | DISCHARGE

## 2022-07-28 RX ADMIN — SODIUM CHLORIDE 1000 MILLILITER(S): 9 INJECTION INTRAMUSCULAR; INTRAVENOUS; SUBCUTANEOUS at 20:53

## 2022-07-28 RX ADMIN — Medication 5 MILLIGRAM(S): at 19:44

## 2022-07-28 RX ADMIN — SODIUM CHLORIDE 1000 MILLILITER(S): 9 INJECTION INTRAMUSCULAR; INTRAVENOUS; SUBCUTANEOUS at 18:37

## 2022-07-28 NOTE — ED PROVIDER NOTE - PATIENT PORTAL LINK FT
You can access the FollowMyHealth Patient Portal offered by [facility] by registering at the following website: [email or web address]. By joining [employer]’s FollowMyHealth portal, you will also be able to view your health information using other applications (apps) compatible with our system.

## 2022-07-28 NOTE — ED PROVIDER NOTE - CARE PROVIDER_API CALL
Johnny Malik  INTERNAL MEDICINE  [address] [place] [address]  Phone: [phone]  Fax: [phone]  Follow Up Time: 1-3 Days

## 2022-07-28 NOTE — ED PROVIDER NOTE - NSICDXPASTMEDICALHX_GEN_ALL_CORE_FT
PAST MEDICAL HISTORY:  Amyotrophic lateral sclerosis (ALS)     Cerebral infarct     Dysphagia     Gastroesophageal reflux disease, unspecified whether esophagitis present     Motor neuron disease ALS    Myasthenia gravis

## 2022-07-28 NOTE — ED ADULT NURSE REASSESSMENT NOTE - NS ED NURSE REASSESS COMMENT FT1
Received patient awake, alert, denies pain. IV patent and infusing right AC receiving bolus as ordered. CM sinus tach 109. Appears to be breathing comfortably Resp rate = 17, oxygen sat =97% on room air.

## 2022-07-28 NOTE — ED ADULT NURSE NOTE - OBJECTIVE STATEMENT
patient presents with wife c/o sob. pt with hx of ALS on noctural cpap. c/o mid sternal chest pain and sob since yesterday at 3pm. pt cannot speak d/t ALS but communicates via typing on phone. wife at bedside. wife states he can typically walk, go up and down stairs without feeling sob but now at rest has significant sob. pt arrives on own cpap. denies fevers, chills, nausea, vomiting, diarrhea, recent illness. no exposures to covid. fully vaccinated.

## 2022-07-28 NOTE — ED PROVIDER NOTE - NSFOLLOWUPINSTRUCTIONS_ED_ALL_ED_FT
xanax every 6 hours as needed for anxiety  have close follow up with primary care provider           Disnea    LO QUE NECESITA SABER:    La disnea es dot dificultad o incomodidad al respirar. Es posible que tenga respiración fatigosa, dolorosa o poco profunda. Es posible que le falte el aire. La disnea puede ocurrir mientras está en reposo o al realizar dot actividad. Es posible que tenga disnea por un corto período de tiempo, o puede ser crónica. La disnea es a menudo un síntoma de dot enfermedad o afección.    INSTRUCCIONES SOBRE EL JAIRO HOSPITALARIA:    Regrese a la penelope de emergencias si:  •Ana Rosa signos y síntomas están igual o empeoran en las siguientes 24 horas del tratamiento.      •Usted tiene escalofríos con temblores o fiebre de más de 102° F (38.9° C).      •Usted tiene un nuevo dolor, presión u opresión en [place] pecho.      •Usted tiene dot nueva o empeora [place] tos o sibilancias (respiración ruidosa en el pecho) o expectora dianne      •Usted siente que no recibe suficiente aire.      •La piel sobre ana rosa costillas o en [place] sarai se hunden cuando usted respira.      •Usted tiene un vania dolor de corey con vómitos y dolor abdominal.      •Usted se siente confundido o mareado      Llame a [place] médico o especialista si:  •Usted tiene preguntas o inquietudes acerca de [place]gomery condición o cuidado.          Medicamentos:  •Los medicamentospueden administrarse para tratar la causa de [place] disnea. Los medicamentos pueden reducir la inflamación de las vías respiratorias o disminuir el líquido alrededor del corazón o los pulmones. Se pueden administrar otros medicamentos para reducir la ansiedad y para que se sienta más calmado y relajado.      •[place] ana rosa medicamentos vivian se le haya indicado.Consulte con [place] médico si usted liane que [place] medicamento no le está ayudando o si presenta efectos secundarios. Infórmele si es alérgico a cualquier medicamento. Mantenga dot lista actualizada de los medicamentos, las vitaminas y los productos herbales que anayeli. Incluya los siguientes datos de los medicamentos: cantidad, frecuencia y motivo de administración. Traiga con usted la lista o los envases de las píldoras a ana rosa citas de seguimiento. Lleve la lista de los medicamentos con usted en chandana de dot emergencia.      Controle la disnea de larga duración:  •[place] un plan de acción.Usted y [place] médico pueden trabajar juntos para crear un plan sobre cómo manejar los episodios de disnea. El plan puede incluir las actividades diarias, cambios de tratamiento y qué hacer si usted tiene problemas respiratorios graves.      •Al dany asiento inclínese hacia adelante en ana rosa codos.[place] ayuda a expandir los pulmones y puede que le sea más fácil respirar.      •Use la respiración con los labios fruncidos cada vez que se sienta corto de respiración.Respire por la nariz y muy despacio exhale por [place] boca con los labios ligeramente fruncidos o en forma de ''U''. Se debería demorar el doble de tiempo al expulsar el aire de lo que le michael en inhalarlo.  Inhalar y exhalar           •No fume.La nicotina y otros químicos contenidos en los cigarrillos y puros pueden causar daño pulmonar y empeorar ana rosa síntomas. Pida información a [place] médico si usted actualmente fuma y necesita ayuda para dejar de fumar. Los cigarrillos electrónicos o el tabaco sin humo igualmente contienen nicotina. Consulte con [place] médico antes de utilizar estos productos.      •[place] o mantenga un peso saludable.[place] médico le puede ayudar a elaborar un plan para perder peso de dot forma serrano si usted tiene sobrepeso.      •Ejercítese según indicaciones.El ejercicio puede ayudar a los pulmones a trabajar más fácilmente. El ejercicio también puede ayudar a perder peso, si es necesario. Trate de hacer unos 30 minutos de ejercicio la mayoría de los días de la semana. [place] médico puede ayudarlo a crear un plan de ejercicios que sea seguro para usted.      Acuda a ana rosa consultas de control con [place] médico o especialista según le indicaron:Anote ana rosa preguntas para que se acuerde de hacerlas lou ana rosa visitas.       © Copyright [employer] 2022

## 2022-07-28 NOTE — ED PROVIDER NOTE - CLINICAL SUMMARY MEDICAL DECISION MAKING FREE TEXT BOX
55 y/o male with a PMHx of ALS, cerebral infarct, chronic dysphagia, GERD, myasthenia gravis, PEG tube in place presents to the ED for chest tightness and SOB since last night. Wife states pt has 43% capacity in lungs. Denies fever, cough, and any other symptoms. Denies history of COVID-19. Denies sick contacts. No other complaints at this time.   PCP: Dr. Malik; Neurologist: Dr. Contreras     Physical Exam: VSS, respiratory rate is 20, O2 sat of 96% on supplemental oxygen, in NAD, lungs clear to ascultation. Heart: S1, S2 RRR & no murmurs, gallops, or rubs. Abd: soft & PEG tube in place. Extremities: FROMI & no edema. Neuro: awake, alert, nonverbal but understands & follows commands. Skin: warm, dry & no rash.     Impression: ALS pt with increasing dyspnea. Rule out pneumonia & rule out cardiac problem.    Plan: labs, CXR, EKG, and may need admission for supplemental oxygen.

## 2022-07-28 NOTE — ED PROVIDER NOTE - PROGRESS NOTE DETAILS
Spoke with Dr. Santana regarding EKG.  Suspect sinus tach and right bundle branch block.  does not see evidence of Brugada pattern Patient has been up to use the restroom without any distress.  Spoke with caregiver who thought the shortness of breath was likely due to anxiety.  Patient was given IV Valium with overall much improvement of symptoms.  Reports shortness of breath has resolved.  Overall appears comfortable.  Requesting to go home.  Will give small dose of Xanax to use as needed through the PEG tube.  Will have close follow-up with primary care doctor.  Denies any current chest pain or shortness of breath. Spoke with Dr. Castaneda regarding EKG.  Suspect sinus tach and right bundle branch block.  does not see evidence of Brugada pattern Patient has been up to use the restroom and ambulatory without any distress.  Spoke with caregiver who thought the shortness of breath was likely due to anxiety.  Patient was given IV Valium with overall much improvement of symptoms.  Reports shortness of breath has resolved.  Overall appears comfortable.  Requesting to go home.  Will give small dose of Xanax to use as needed through the PEG tube.  Will have close follow-up with primary care doctor.  Denies any current chest pain or shortness of breath. d-dimer neg, do not suspect PE. reports "always has tachycardic" per caregiver

## 2022-07-28 NOTE — ED PROVIDER NOTE - OBJECTIVE STATEMENT
56-year-old male with history of dysphagia, history of CVA, GERD, myasthenia gravis, and ALS presents with chest tightness and shortness of breath since last night.  Wife states he has 43% use of his lungs.  Has a PEG tube for eating and drinking.  No known fevers.  No vomiting or diarrhea.  Reports similar symptoms and was seen here in November 2021.  Was not admitted at that time.  Reports symptoms similar in quality and character. patient uses oxygen at night.  Has been using occasional oxygen since yesterday throughout the day. patient non-verbal but can nod yes or no.   PCP Johnny Guajardo  Neuro "[facility] [place]"

## 2022-07-28 NOTE — ED PROVIDER NOTE - NS ED ATTENDING STATEMENT MOD
Yes This was a shared visit with the ANDRÉS. I reviewed and verified the documentation and independently performed the documented:

## 2022-07-29 PROBLEM — R13.10 DYSPHAGIA, UNSPECIFIED: Chronic | Status: ACTIVE | Noted: 2021-11-19

## 2022-07-29 PROBLEM — G12.20 MOTOR NEURON DISEASE, UNSPECIFIED: Chronic | Status: ACTIVE | Noted: 2021-07-06

## 2024-08-24 ENCOUNTER — EMERGENCY (EMERGENCY)
Facility: HOSPITAL | Age: 59
LOS: 0 days | Discharge: HOME | End: 2024-08-24
Attending: FAMILY MEDICINE
Payer: MEDICAID

## 2024-08-24 VITALS
SYSTOLIC BLOOD PRESSURE: 156 MMHG | DIASTOLIC BLOOD PRESSURE: 93 MMHG | OXYGEN SATURATION: 97 % | HEART RATE: 114 BPM | RESPIRATION RATE: 27 BRPM

## 2024-08-24 VITALS — TEMPERATURE: 98 F

## 2024-08-24 DIAGNOSIS — G12.21 AMYOTROPHIC LATERAL SCLEROSIS: ICD-10-CM

## 2024-08-24 DIAGNOSIS — K08.89 OTHER SPECIFIED DISORDERS OF TEETH AND SUPPORTING STRUCTURES: ICD-10-CM

## 2024-08-24 DIAGNOSIS — Z99.11 DEPENDENCE ON RESPIRATOR [VENTILATOR] STATUS: ICD-10-CM

## 2024-08-24 DIAGNOSIS — Z93.1 GASTROSTOMY STATUS: Chronic | ICD-10-CM

## 2024-08-24 PROCEDURE — 99282 EMERGENCY DEPT VISIT SF MDM: CPT

## 2024-08-24 PROCEDURE — 99283 EMERGENCY DEPT VISIT LOW MDM: CPT | Mod: 25
